# Patient Record
Sex: FEMALE | Race: WHITE | NOT HISPANIC OR LATINO | Employment: FULL TIME | URBAN - NONMETROPOLITAN AREA
[De-identification: names, ages, dates, MRNs, and addresses within clinical notes are randomized per-mention and may not be internally consistent; named-entity substitution may affect disease eponyms.]

---

## 2023-05-30 LAB
HEMATOCRIT (%) IN BLOOD BY AUTOMATED COUNT: 41.9 % (ref 36–46)
HEMOGLOBIN (G/DL) IN BLOOD: 13.4 G/DL (ref 12–16)

## 2023-06-07 ENCOUNTER — HOSPITAL ENCOUNTER (OUTPATIENT)
Dept: DATA CONVERSION | Facility: HOSPITAL | Age: 33
End: 2023-06-07
Attending: OTOLARYNGOLOGY | Admitting: OTOLARYNGOLOGY

## 2023-06-07 DIAGNOSIS — J35.03 CHRONIC TONSILLITIS AND ADENOIDITIS: ICD-10-CM

## 2023-06-07 DIAGNOSIS — J45.909 UNSPECIFIED ASTHMA, UNCOMPLICATED (HHS-HCC): ICD-10-CM

## 2023-06-07 DIAGNOSIS — F41.9 ANXIETY DISORDER, UNSPECIFIED: ICD-10-CM

## 2023-06-07 DIAGNOSIS — J35.3 HYPERTROPHY OF TONSILS WITH HYPERTROPHY OF ADENOIDS: ICD-10-CM

## 2023-06-07 DIAGNOSIS — F32.A DEPRESSION, UNSPECIFIED: ICD-10-CM

## 2023-07-12 LAB
COMPLETE PATHOLOGY REPORT: NORMAL
CONVERTED CLINICAL DIAGNOSIS-HISTORY: NORMAL
CONVERTED FINAL DIAGNOSIS: NORMAL
CONVERTED FINAL REPORT PDF LINK TO COPY AND PASTE: NORMAL
CONVERTED GROSS DESCRIPTION: NORMAL

## 2023-09-07 ENCOUNTER — HOSPITAL ENCOUNTER (OUTPATIENT)
Age: 33
Discharge: HOME | End: 2023-09-07
Payer: COMMERCIAL

## 2023-09-07 VITALS — BODY MASS INDEX: 39.29 KG/M2 | HEIGHT: 64 IN | WEIGHT: 230.16 LBS

## 2023-09-07 DIAGNOSIS — Z13.1: ICD-10-CM

## 2023-09-07 DIAGNOSIS — Z13.29: ICD-10-CM

## 2023-09-07 DIAGNOSIS — Z12.31: ICD-10-CM

## 2023-09-07 DIAGNOSIS — Z12.4: ICD-10-CM

## 2023-09-07 DIAGNOSIS — N93.9: ICD-10-CM

## 2023-09-07 DIAGNOSIS — Z13.220: Primary | ICD-10-CM

## 2023-09-07 LAB
CHOLEST SERPL-MCNC: 212 MG/DL
GLUCOSE: 99 MG/DL (ref 74–106)
TRIGLYCERIDES: 79 MG/DL
VITAMIN D,25 HYDROXY: 28.6 NG/ML
VLDLC SERPL-MCNC: 16 MG/DL (ref 5–40)

## 2023-09-07 PROCEDURE — 82670 ASSAY OF TOTAL ESTRADIOL: CPT

## 2023-09-07 PROCEDURE — 86376 MICROSOMAL ANTIBODY EACH: CPT

## 2023-09-07 PROCEDURE — 82947 ASSAY GLUCOSE BLOOD QUANT: CPT

## 2023-09-07 PROCEDURE — 80061 LIPID PANEL: CPT

## 2023-09-07 PROCEDURE — 84439 ASSAY OF FREE THYROXINE: CPT

## 2023-09-07 PROCEDURE — 88175 CYTOPATH C/V AUTO FLUID REDO: CPT

## 2023-09-07 PROCEDURE — 83516 IMMUNOASSAY NONANTIBODY: CPT

## 2023-09-07 PROCEDURE — G0145 SCR C/V CYTO,THINLAYER,RESCR: HCPCS

## 2023-09-07 PROCEDURE — 84443 ASSAY THYROID STIM HORMONE: CPT

## 2023-09-07 PROCEDURE — 82627 DEHYDROEPIANDROSTERONE: CPT

## 2023-09-07 PROCEDURE — 82306 VITAMIN D 25 HYDROXY: CPT

## 2023-09-07 PROCEDURE — 87624 HPV HI-RISK TYP POOLED RSLT: CPT

## 2023-09-07 PROCEDURE — 36415 COLL VENOUS BLD VENIPUNCTURE: CPT

## 2023-09-10 LAB — FLECAINIDE SERPL-MCNC: 5.33 NG/ML

## 2023-09-30 NOTE — H&P
History & Physical Reviewed:   Pregnant/Lactating:  ·  Are You Pregnant no   ·  Are You Currently Breastfeeding no     I have reviewed the History and Physical dated:  01-Jun-2023   History and Physical reviewed and relevant findings noted. Patient examined to review pertinent physical  findings.: No significant changes   Home Medications Reviewed: no changes noted   Allergies Reviewed: no changes noted       ERAS (Enhanced Recovery After Surgery):  ·  ERAS Patient: no     Consent:   COVID-19 Consent:  ·  COVID-19 Risk Consent Surgeon has reviewed key risks related to the risk of drea COVID-19 and if they contract COVID-19 what the risks are.       Electronic Signatures:  Saud Cotter)  (Signed 07-Jun-2023 13:26)   Authored: History & Physical Reviewed, ERAS, Consent,  Note Completion      Last Updated: 07-Jun-2023 13:26 by Saud Cotter)

## 2023-10-02 NOTE — OP NOTE
PROCEDURE DETAILS    Preoperative Diagnosis:  Tonsillitis and adenoiditis, chronic, J35.03  Hyperplasia of tonsils and adenoids, J35.3    Postoperative Diagnosis:  Tonsillitis and adenoiditis, chronic, J35.03  Hyperplasia of tonsils and adenoids, J35.3    Surgeon: Saud Cotter  Resident/Fellow/Other Assistant: None of these were associated with this case    Procedure:  1. T&A    Anesthesia: No anesthesiologist associated with this case  Estimated Blood Loss: minimal  Findings: Severe hypertrophy        Operative Report:   CLINICAL NOTE:The patient is a 32-year-old female with a history of with a history of chronic adenotonsillitis and adenotonsillar hypertrophy.     OPERATIVE NOTE:   The patient was taken to the operating room and placed supine on the operating room table and after administration of general endotracheal anesthesia, the table was turned 90 degrees, head drape was applied and the McIvor mouth gag was inserted and suspended  from the Alva stand.  A red rubber catheter was placed through the nose and out the oral cavity to suspend a normal palate.  A large adenoid pad was removed from the nasopharynx using curettes and [place] forceps and the nasopharynx was packed with  Bismuth subgallate epinephrine soaked packs.      Attention was then directed to the tonsillectomy and the right tonsil was grasped at its superior pole and medialized.  A mucosal incision was made with the Coblation wand and the tonsillar capsule was identified.  The tonsil was removed along the capsular  plane and Bismuth was smeared into the tonsillar fossa.  The nasopharyngeal packs were replaced.  Attention was then directed to the opposite tonsil. The procedure was repeated in an identical fashion with identical clinical findings.  The nasopharyngeal  packs were removed and hemostasis was found to have been achieved.  The nasopharynx and oral cavity were then irrigated with normal saline.    The patient was then  reversed from anesthesia, extubated, and transferred to the recovery room (PACU) in satisfactory condition.  The patient tolerated  the procedure well with minimal blood loss.                        Attestation:   Note Completion:  Attending Attestation I performed the procedure without a resident         Electronic Signatures:  Saud Cotter)  (Signed 07-Jun-2023 14:35)   Authored: Post-Operative Note, Chart Review, Note Completion      Last Updated: 07-Jun-2023 14:35 by Saud Cotter)

## 2024-02-27 ENCOUNTER — HOSPITAL ENCOUNTER (OUTPATIENT)
Dept: HOSPITAL 100 - MTLAB | Age: 34
Discharge: HOME | End: 2024-02-27
Payer: COMMERCIAL

## 2024-02-27 DIAGNOSIS — L40.0: Primary | ICD-10-CM

## 2024-02-27 PROCEDURE — 86480 TB TEST CELL IMMUN MEASURE: CPT

## 2024-02-27 PROCEDURE — 36415 COLL VENOUS BLD VENIPUNCTURE: CPT

## 2024-03-01 LAB
M TB TUBERC IFN-G BLD QL: 0.01 IU/ML
QNTFERON TB MITOGEN VALUE: > 10 IU/ML
QNTFERON TB NIL VALUE: 0.01 IU/ML
QNTFERON TB2+ AG VALUE: 0.02 IU/ML

## 2024-04-29 ENCOUNTER — HOSPITAL ENCOUNTER (OUTPATIENT)
Age: 34
Discharge: HOME | End: 2024-04-29
Payer: COMMERCIAL

## 2024-04-29 DIAGNOSIS — Z12.4: ICD-10-CM

## 2024-04-29 DIAGNOSIS — Z11.3: Primary | ICD-10-CM

## 2024-04-29 PROCEDURE — 86803 HEPATITIS C AB TEST: CPT

## 2024-04-29 PROCEDURE — 86780 TREPONEMA PALLIDUM: CPT

## 2024-04-29 PROCEDURE — G0145 SCR C/V CYTO,THINLAYER,RESCR: HCPCS

## 2024-04-29 PROCEDURE — 88305 TISSUE EXAM BY PATHOLOGIST: CPT

## 2024-04-29 PROCEDURE — 87624 HPV HI-RISK TYP POOLED RSLT: CPT

## 2024-04-29 PROCEDURE — 86696 HERPES SIMPLEX TYPE 2 TEST: CPT

## 2024-04-29 PROCEDURE — 87491 CHLMYD TRACH DNA AMP PROBE: CPT

## 2024-04-29 PROCEDURE — 86703 HIV-1/HIV-2 1 RESULT ANTBDY: CPT

## 2024-04-29 PROCEDURE — 88341 IMHCHEM/IMCYTCHM EA ADD ANTB: CPT

## 2024-04-29 PROCEDURE — 88175 CYTOPATH C/V AUTO FLUID REDO: CPT

## 2024-04-29 PROCEDURE — 87591 N.GONORRHOEAE DNA AMP PROB: CPT

## 2024-04-29 PROCEDURE — 36415 COLL VENOUS BLD VENIPUNCTURE: CPT

## 2024-04-29 PROCEDURE — 86695 HERPES SIMPLEX TYPE 1 TEST: CPT

## 2024-04-29 PROCEDURE — 88342 IMHCHEM/IMCYTCHM 1ST ANTB: CPT

## 2024-06-21 ENCOUNTER — TELEPHONE (OUTPATIENT)
Dept: PLASTIC SURGERY | Facility: CLINIC | Age: 34
End: 2024-06-21
Payer: COMMERCIAL

## 2024-06-21 NOTE — TELEPHONE ENCOUNTER
Spoke to patient regarding upcoming appointment with Dr. Wallace and insurance criteria for panniculectomy surgery. Patient has lost 30lbs and has remained a stable weight since December 2023. Patient would like to lose an additional 20lbs. I encouraged her to get to her goal weight and educated her on the need to be a stable weight for 6 months. She also endorses rashing under pannus and I advised her to reach out to her PCP for prescription rash treatment. She was in agreement with plan and will call once she reaches her goal weight and is a stable weight for 6 months.

## 2024-07-01 ENCOUNTER — APPOINTMENT (OUTPATIENT)
Dept: PLASTIC SURGERY | Facility: CLINIC | Age: 34
End: 2024-07-01
Payer: COMMERCIAL

## 2024-07-15 ENCOUNTER — OFFICE VISIT (OUTPATIENT)
Dept: URGENT CARE | Facility: CLINIC | Age: 34
End: 2024-07-15
Payer: COMMERCIAL

## 2024-07-15 VITALS
RESPIRATION RATE: 16 BRPM | WEIGHT: 200 LBS | HEIGHT: 64 IN | HEART RATE: 74 BPM | OXYGEN SATURATION: 96 % | TEMPERATURE: 98.7 F | BODY MASS INDEX: 34.15 KG/M2 | SYSTOLIC BLOOD PRESSURE: 119 MMHG | DIASTOLIC BLOOD PRESSURE: 81 MMHG

## 2024-07-15 DIAGNOSIS — J02.9 ACUTE PHARYNGITIS, UNSPECIFIED ETIOLOGY: Primary | ICD-10-CM

## 2024-07-15 LAB — POC GROUP A STREP, PCR: NOT DETECTED

## 2024-07-15 PROCEDURE — 99213 OFFICE O/P EST LOW 20 MIN: CPT | Performed by: NURSE PRACTITIONER

## 2024-07-15 PROCEDURE — 87651 STREP A DNA AMP PROBE: CPT | Mod: QW | Performed by: NURSE PRACTITIONER

## 2024-07-15 RX ORDER — IXEKIZUMAB 80 MG/ML
80 INJECTION, SOLUTION SUBCUTANEOUS
COMMUNITY
Start: 2022-07-28

## 2024-07-15 RX ORDER — PREDNISONE 20 MG/1
20 TABLET ORAL DAILY
Qty: 5 TABLET | Refills: 0 | Status: SHIPPED | OUTPATIENT
Start: 2024-07-15 | End: 2024-07-20

## 2024-07-15 RX ORDER — BUPROPION HYDROCHLORIDE 150 MG/1
TABLET ORAL EVERY 24 HOURS
COMMUNITY

## 2024-07-15 RX ORDER — CITALOPRAM 40 MG/1
40 TABLET, FILM COATED ORAL DAILY
COMMUNITY

## 2024-07-15 RX ORDER — APIXABAN 5 MG/1
5 TABLET, FILM COATED ORAL 2 TIMES DAILY
COMMUNITY

## 2024-07-15 NOTE — PROGRESS NOTES
33 y.o. female presents for evaluation of sore throat, dry cough for the past 10 days. Denies fever, nasal congestion, body aches, fatigue, n/v/d, or any other associated symptoms. No otc meds for symptoms. States children were ill last week.      Vitals:    07/15/24 1255   BP: 119/81   Pulse: 74   Resp: 16   Temp: 37.1 °C (98.7 °F)   SpO2: 96%       No Known Allergies    Medication Documentation Review Audit       Reviewed by Bruce Evans MA (Medical Assistant) on 07/15/24 at 1255      Medication Order Taking? Sig Documenting Provider Last Dose Status   buPROPion XL (Wellbutrin XL) 150 mg 24 hr tablet [id number] Yes Take by mouth once every 24 hours. Historical Provider, MD Taking Active   citalopram (CeleXA) 40 mg tablet [id number] Yes Take 1 tablet (40 mg) by mouth once daily. Historical Provider, MD Taking Active   Eliquis 5 mg tablet [id number] Yes Take 1 tablet (5 mg) by mouth 2 times a day. Historical Provider, MD Taking Active   ixekizumab (Taltz Autoinjector) 80 mg/mL injection [id number] Yes Inject 1 mL (80 mg) under the skin. Historical Provider, MD Taking Active                    No past medical history on file.    No past surgical history on file.    ROS  See HPI    Physical Exam  Vitals and nursing note reviewed.   Constitutional:       Appearance: Normal appearance. She is normal weight.   HENT:      Head: Normocephalic and atraumatic.      Right Ear: Tympanic membrane and ear canal normal.      Left Ear: Tympanic membrane and ear canal normal.      Nose: Nose normal.      Mouth/Throat:      Mouth: Mucous membranes are moist.      Pharynx: Posterior oropharyngeal erythema (mild) present.   Eyes:      Extraocular Movements: Extraocular movements intact.      Conjunctiva/sclera: Conjunctivae normal.      Pupils: Pupils are equal, round, and reactive to light.   Cardiovascular:      Rate and Rhythm: Normal rate and regular rhythm.      Heart sounds: No murmur heard.  Pulmonary:      Effort: Pulmonary  effort is normal.      Breath sounds: Normal breath sounds.   Skin:     General: Skin is warm and dry.      Capillary Refill: Capillary refill takes less than 2 seconds.   Neurological:      General: No focal deficit present.      Mental Status: She is alert and oriented to person, place, and time.   Psychiatric:         Mood and Affect: Mood normal.         Behavior: Behavior normal.       Recent Results (from the past 1 hour(s))   POCT Group A Streptococcus, PCR manually resulted    Collection Time: 07/15/24  1:29 PM   Result Value Ref Range    POC Group A Strep, PCR Not Detected Not Detected       Assessment/Plan/MDM  Randi was seen today for sore throat.  Diagnoses and all orders for this visit:  Acute pharyngitis, unspecified etiology (Primary)  -     POCT Group A Streptococcus, PCR manually resulted    Advised pt to use warm salt water gargles, otc analgesics, push by mouth fluids and rest.  Patient's clinical presentation is otherwise unremarkable at this time. Patient is discharged with instructions to follow-up with primary care or seek emergency medical attention for worsening symptoms or any new concerns.    I did personally review Randi's past medical history, surgical history, social history, as well as family history (when relevant).  In this case, I also oversaw the her drug management by reviewing her medication list, allergy list, as well as the medications that I prescribed during the UC course and/or recommended as an out-patient (including possible OTC medications such as acetaminophen, NSAIDs , etc).    After reviewing the items above, I did not look at previous medical documentation, such as recent hospitalizations, office visits, and/or recent consultations with PCP/specialist.                          SDOH:   Another factor that I considered in Randi's care was her Social Determinants of Health (SDOH). During this UC encounter, she did not have social determinants of health. Those SDOH  influencing Randi's care are: none      Teodoro Cazares, CNP  [facility]  [phone]

## 2024-07-23 ENCOUNTER — HOSPITAL ENCOUNTER (OUTPATIENT)
Age: 34
Discharge: HOME | End: 2024-07-23
Payer: COMMERCIAL

## 2024-07-23 DIAGNOSIS — Z11.3: Primary | ICD-10-CM

## 2024-07-23 PROCEDURE — 36415 COLL VENOUS BLD VENIPUNCTURE: CPT

## 2024-07-23 PROCEDURE — 86706 HEP B SURFACE ANTIBODY: CPT

## 2024-07-23 PROCEDURE — 86780 TREPONEMA PALLIDUM: CPT

## 2024-07-23 PROCEDURE — 87591 N.GONORRHOEAE DNA AMP PROB: CPT

## 2024-07-23 PROCEDURE — 86703 HIV-1/HIV-2 1 RESULT ANTBDY: CPT

## 2024-07-23 PROCEDURE — 86803 HEPATITIS C AB TEST: CPT

## 2024-07-23 PROCEDURE — 87491 CHLMYD TRACH DNA AMP PROBE: CPT

## 2025-01-21 ENCOUNTER — HOSPITAL ENCOUNTER (OUTPATIENT)
Age: 35
Discharge: HOME | End: 2025-01-21
Payer: COMMERCIAL

## 2025-01-21 DIAGNOSIS — L40.0: Primary | ICD-10-CM

## 2025-01-21 DIAGNOSIS — Z79.899: ICD-10-CM

## 2025-01-21 PROCEDURE — 86480 TB TEST CELL IMMUN MEASURE: CPT

## 2025-01-21 PROCEDURE — 36415 COLL VENOUS BLD VENIPUNCTURE: CPT

## 2025-01-25 LAB
M TB TUBERC IFN-G BLD QL: 0.03 IU/ML
QNTFERON TB MITOGEN VALUE: > 10 IU/ML
QNTFERON TB NIL VALUE: 0.06 IU/ML
QNTFERON TB2+ AG VALUE: 0.03 IU/ML